# Patient Record
(demographics unavailable — no encounter records)

---

## 2024-10-28 NOTE — HISTORY OF PRESENT ILLNESS
[6] : 6 [1] : 2 [Constant] : constant [de-identified] : Patient returns for his left 4th toe fracture from 9/28/24.  He has been WB in post op shoe and kar taping.  He reports some swelling but no real pain at this time.   [] : no [FreeTextEntry1] : left ring toe

## 2024-10-28 NOTE — DISCUSSION/SUMMARY
[de-identified] : Patient is doing better. WBAT in hard sole shoe. Icing prn. [place] taping toes based on symptoms. He can slowly increase his activity level as tolerated.  Repeat x-ray will be performed at the next office visit (L4).

## 2024-10-28 NOTE — PHYSICAL EXAM
[Mild] : mild swelling of toe(s) [4th] : 4th [NL (40)] : plantar flexion 40 degrees [NL 30)] : inversion 30 degrees [NL (20)] : eversion 20 degrees [5___] : eversion 5[unfilled]/5 [2+] : posterior tibialis pulse: 2+ [Normal] : saphenous nerve sensation normal [Left] : left toe [Toe #: ____] : toe # [unfilled] [] : non-antalgic [de-identified] : WB in post op shoe [de-identified] : Nondisplaced fracture 4th proximal phalanx with some callus and a fading fracture line without displacement.

## 2024-12-06 NOTE — PHYSICAL EXAM
[Mild] : mild swelling of toe(s) [4th] : 4th [NL (40)] : plantar flexion 40 degrees [NL 30)] : inversion 30 degrees [NL (20)] : eversion 20 degrees [5___] : eversion 5[unfilled]/5 [2+] : posterior tibialis pulse: 2+ [Normal] : saphenous nerve sensation normal [Left] : left toe [Toe #: ____] : toe # [unfilled] [] : non-antalgic [de-identified] : Mild decreased flexion at 4th toe. [de-identified] : WB in post op shoe [de-identified] : Nondisplaced fracture 4th proximal phalanx with improved callus and a fading fracture line without displacement.

## 2024-12-06 NOTE — DISCUSSION/SUMMARY
[de-identified] : Patient continues to improve. He can do activities as tolerated. He will do passive/active flexion exercises of the lesser toes.  Repeat x-ray will be performed at the next office visit (L4).

## 2024-12-06 NOTE — HISTORY OF PRESENT ILLNESS
[6] : 6 [1] : 2 [Constant] : constant [de-identified] : Patient returns for his left 4th toe fracture from 9/28/24.  He has been WB in a sneaker and is no longer kar taping.  He reports having no pain. [] : no [FreeTextEntry1] : left ring toe

## 2025-02-14 NOTE — PHYSICAL EXAM
[NL (40)] : plantar flexion 40 degrees [NL 30)] : inversion 30 degrees [NL (20)] : eversion 20 degrees [5___] : eversion 5[unfilled]/5 [2+] : posterior tibialis pulse: 2+ [Normal] : saphenous nerve sensation normal [Left] : left toe [Toe #: ____] : toe # [unfilled] [] : non-antalgic [The fracture is in acceptable alignment. There is progression in healing seen] : The fracture is in acceptable alignment. There is progression in healing seen [FreeTextEntry3] : Minimal swelling in 4th toe. [de-identified] : Mild decreased flexion at 4th toe. [de-identified] : WB in post op shoe [de-identified] : Nondisplaced fracture 4th proximal phalanx with improved callus and a fading fracture line without displacement. Fracture line only slightly visible at this time.

## 2025-02-14 NOTE — HISTORY OF PRESENT ILLNESS
[6] : 6 [1] : 2 [Constant] : constant [de-identified] : Patient returns for his left 4th toe fracture from 9/28/24.  He has been WB in a sneaker and is back to all of his activities.  No pain at this time, but he reports a rare "achy" feeling at worst. [] : no [FreeTextEntry1] : left ring toe

## 2025-02-14 NOTE — DISCUSSION/SUMMARY
[de-identified] : Patient is clinically healed and near completely radiographically healed. He has no restrictions in terms of activities. He will return if he has any issues.